# Patient Record
Sex: FEMALE | URBAN - METROPOLITAN AREA
[De-identification: names, ages, dates, MRNs, and addresses within clinical notes are randomized per-mention and may not be internally consistent; named-entity substitution may affect disease eponyms.]

---

## 2018-04-12 ENCOUNTER — APPOINTMENT (RX ONLY)
Dept: URBAN - METROPOLITAN AREA CLINIC 347 | Facility: CLINIC | Age: 48
Setting detail: DERMATOLOGY
End: 2018-04-12

## 2018-04-12 DIAGNOSIS — L65.9 NONSCARRING HAIR LOSS, UNSPECIFIED: ICD-10-CM

## 2018-04-12 DIAGNOSIS — L70.8 OTHER ACNE: ICD-10-CM

## 2018-04-12 PROCEDURE — ? PRESCRIPTION

## 2018-04-12 PROCEDURE — ? TREATMENT REGIMEN

## 2018-04-12 PROCEDURE — 99202 OFFICE O/P NEW SF 15 MIN: CPT

## 2018-04-12 PROCEDURE — ? COUNSELING

## 2018-04-12 RX ORDER — BIMATOPROST 0.3 MG/ML
SOLUTION/ DROPS OPHTHALMIC
Qty: 1 | Refills: 5 | Status: ERX | COMMUNITY
Start: 2018-04-12

## 2018-04-12 RX ORDER — SPIRONOLACTONE 25 MG/1
TABLET, FILM COATED ORAL
Qty: 60 | Refills: 0 | Status: ERX | COMMUNITY
Start: 2018-04-12

## 2018-04-12 RX ORDER — TRETINOIN 0.05 G/100G
GEL TOPICAL
Qty: 1 | Refills: 2 | Status: ERX | COMMUNITY
Start: 2018-04-12

## 2018-04-12 RX ADMIN — TRETINOIN: 0.05 GEL TOPICAL at 19:45

## 2018-04-12 RX ADMIN — BIMATOPROST: 0.3 SOLUTION/ DROPS OPHTHALMIC at 19:47

## 2018-04-12 RX ADMIN — SPIRONOLACTONE: 25 TABLET, FILM COATED ORAL at 19:40

## 2018-04-12 ASSESSMENT — LOCATION DETAILED DESCRIPTION DERM
LOCATION DETAILED: LEFT LATERAL BUCCAL CHEEK
LOCATION DETAILED: RIGHT LATERAL BUCCAL CHEEK

## 2018-04-12 ASSESSMENT — LOCATION SIMPLE DESCRIPTION DERM
LOCATION SIMPLE: LEFT CHEEK
LOCATION SIMPLE: RIGHT CHEEK

## 2018-04-12 ASSESSMENT — LOCATION ZONE DERM: LOCATION ZONE: FACE

## 2018-04-12 NOTE — PROCEDURE: TREATMENT REGIMEN
Initiate Treatment: Spironolactone 25mg up to twice daily start with one pill at night\\n.5% Tretinoin Gel \\nCebatrol pads up to twice daily \\nZO extol polish several times a week
Detail Level: Simple
Initiate Treatment: Latisse nightly to lash line.\\nViviscal take one by mouth twice daily (to help thicken hair on scalp).  Pt is going to think about purchasing this